# Patient Record
(demographics unavailable — no encounter records)

---

## 2018-09-07 NOTE — PHYS DOC
Past Medical History


Past Medical History:  Asthma


Past Surgical History:  No Surgical History


Alcohol Use:  None


Drug Use:  None





General Pediatric Assessment


History of Present Illness


History of Present Illness





Patient is a [age] year old [sex] who presents with []





Historian was the [].





Review of Systems


Review of Systems





Constitutional: Denies fever or chills []


Eyes: Denies change in visual acuity, redness, or eye pain []


HENT: Denies nasal congestion or sore throat []


Respiratory: Denies cough or shortness of breath []


Cardiovascular: No additional information not addressed in HPI []


GI: Denies abdominal pain, nausea, vomiting, bloody stools or diarrhea []


: Denies dysuria or hematuria []


Musculoskeletal: Denies back pain or joint pain []


Integument: Denies rash or skin lesions []


Neurologic: Denies headache, focal weakness or sensory changes []


Endocrine: Denies polyuria or polydipsia []





All other systems were reviewed and found to be within normal limits, except as 

documented in this note.





Allergies


Allergies





Allergies








Coded Allergies Type Severity Reaction Last Updated Verified


 


  No Known Drug Allergies    9/7/18 No











Physical Exam


Physical Exam





Constitutional: Well developed, well nourished, no acute distress, non-toxic 

appearance, positive interaction, playful. []


HENT: Normocephalic, atraumatic, bilateral external ears normal, oropharynx 

moist, no oral exudates, nose normal. [] 


Eyes: PERRLA, conjunctiva normal, no discharge. []


Neck: Normal range of motion, no tenderness, supple, no stridor. []


Cardiovascular: Normal heart rate, normal rhythm, no murmurs, no rubs, no 

gallops. []


Thorax and Lungs: Normal breath sounds, no respiratory distress, no wheezing, 

no chest tenderness, no retractions, no accessory muscle use. []


Abdomen: Bowel sounds normal, soft, no tenderness, no masses []


Skin: Warm, dry, no erythema, no rash. []


Back: No tenderness, no CVA tenderness. []


Extremities: Intact distal pulses, no tenderness, no cyanosis, ROM intact, no 

edema, no deformities. [] 


Neurologic: Alert and interactive, normal motor function, normal sensory 

function, no focal deficits noted. []


Vital Signs





 Vital Signs








  Date Time  Temp Pulse Resp B/P (MAP) Pulse Ox O2 Delivery O2 Flow Rate FiO2


 


9/7/18 19:13 98.4  16  99   





 98.4       











Radiology/Procedures


Radiology/Procedures


[]





Course & Med Decision Making


Course & Med Decision Making


Pertinent Labs and Imaging studies reviewed. (See chart for details)





[]





Dragon Disclaimer


Dragon Disclaimer


This electronic medical record was generated, in whole or in part, using a 

voice recognition dictation system.





Departure


Departure


Impression:  


 Primary Impression:  


 Sprain of left ring finger


Disposition:  01 HOME, SELF-CARE


Condition:  STABLE


Referrals:  


UNKNOWN PCP NAME (PCP)


Patient Instructions:  Finger Sprain, Easy-to-Read





Additional Instructions:  


Take Tylenol or ibuprofen as needed for pain. Recommend application of ice to 

sore area and elevation of the affected finger for reduction of pain. Wear the 

aluminum finger splint that was provided until your follow-up appointment. 

Follow-up with your pediatrician next week. Return to the ER if her symptoms 

worsen





Problem Qualifiers








 Primary Impression:  


 Sprain of left ring finger


 Encounter type:  initial encounter  Sprain of finger site:  

metacarpophalangeal joint  Qualified Codes:  S63.655A - Sprain of 

metacarpophalangeal joint of left ring finger, initial encounter








TARAS JUAREZ APRN Sep 7, 2018 20:20

## 2018-09-08 NOTE — RAD
EXAM: 3 views left hand

 

DATE: 9/7/2018 7:20 PM

 

INDICATION: 4th digit pain after bent back and felt a pop

 

COMPARISON: No Prior

 

FINDINGS/

IMPRESSION:

No evidence of acute fracture or dislocation. Joint spaces are preserved 

without significant degenerative/proliferative change. Moderate soft 

tissue swelling about the ring finger PIP joint, specifically no definite 

associated fracture or subluxation.

 

Electronically signed by: Parmjit Muir MD (9/8/2018 9:07 AM) [facility]